# Patient Record
Sex: FEMALE | Race: WHITE
[De-identification: names, ages, dates, MRNs, and addresses within clinical notes are randomized per-mention and may not be internally consistent; named-entity substitution may affect disease eponyms.]

---

## 2021-02-25 ENCOUNTER — HOSPITAL ENCOUNTER (OUTPATIENT)
Dept: HOSPITAL 53 - M PLALAB | Age: 31
End: 2021-02-25
Attending: ADVANCED PRACTICE MIDWIFE
Payer: COMMERCIAL

## 2021-02-25 DIAGNOSIS — Z34.91: Primary | ICD-10-CM

## 2021-02-25 LAB
HCT VFR BLD AUTO: 36.6 % (ref 36–47)
HCV AB SER QL: < 0 INDEX (ref ?–0.8)
HGB BLD-MCNC: 11.9 G/DL (ref 12–15.5)
HIV 1+2 AB+HIV1 P24 AG SERPL QL IA: NEGATIVE
MCH RBC QN AUTO: 31.2 PG (ref 27–33)
MCHC RBC AUTO-ENTMCNC: 32.5 G/DL (ref 32–36.5)
MCV RBC AUTO: 95.8 FL (ref 80–96)
PLATELET # BLD AUTO: 206 10^3/UL (ref 150–450)
RBC # BLD AUTO: 3.82 10^6/UL (ref 4–5.4)
WBC # BLD AUTO: 6.6 10^3/UL (ref 4–10)

## 2021-04-22 ENCOUNTER — HOSPITAL ENCOUNTER (OUTPATIENT)
Dept: HOSPITAL 53 - M WHC | Age: 31
End: 2021-04-22
Attending: ADVANCED PRACTICE MIDWIFE
Payer: COMMERCIAL

## 2021-04-22 DIAGNOSIS — Z3A.12: ICD-10-CM

## 2021-04-22 DIAGNOSIS — Z36.89: Primary | ICD-10-CM

## 2021-04-22 NOTE — REP
INDICATION:

ANATOMY/DEDRICK 9/15/21



COMPARISON:

None.



TECHNIQUE:

Transabdominal obstetrical ultrasound with color Doppler evaluation.



FINDINGS:

Examination demonstrates a single live intrauterine pregnancy in breech presentation.

Fetal motion is identified by technologist.  Placenta is noted posterior and  grade 1

without evidence for placenta previa or abruption.  Amniotic fluid volume is normal.

Cervix measures 3.9 cm in length and appears closed..



Gestational age by LMP 19 weeks 1 day with DEDRICK 09/15/2021.

Gestational age by current measurements 19 weeks 4 days with DEDRICK 09/12/2021.



FHR equals 146 beats per minute.



BPD:      4.3 cm at        19 weeks 0 days

HC:         16.9 cm at         19 weeks 4 days

AC:         14.3 cm at      19 weeks 4 days

FL:          3.2 cm at      19 weeks 6 days

HL:          3.0 cm at      19 weeks 5 days

HC/AC: 1.18

Estimated fetal weight 306 grams (79thpercentile).



Anatomical assessment demonstrates normal structures including cranium, choroid

plexus, cavum, cerebellum/posterior fossa, facial features, lungs, four-chamber

heart/ventricular outflow tracts, diaphragm, stomach, cord insertion/three-vessel

cord, kidneys/bladder, spine, and extremities.



IMPRESSION:

Single live intrauterine pregnancy in breech presentation demonstrating appropriate

estimated fetal weight.  Anatomical assessment is complete and normal.  No gross

abnormalities are identified.





<Electronically signed by Rashaun Thompson > 04/22/21 [id number]

## 2021-06-11 ENCOUNTER — HOSPITAL ENCOUNTER (OUTPATIENT)
Dept: HOSPITAL 53 - M PLALAB | Age: 31
End: 2021-06-11
Attending: ADVANCED PRACTICE MIDWIFE
Payer: COMMERCIAL

## 2021-06-11 DIAGNOSIS — O99.332: Primary | ICD-10-CM

## 2021-06-11 LAB
HCT VFR BLD AUTO: 37.5 % (ref 36–47)
HGB BLD-MCNC: 12 G/DL (ref 12–15.5)
MCH RBC QN AUTO: 32.1 PG (ref 27–33)
MCHC RBC AUTO-ENTMCNC: 32 G/DL (ref 32–36.5)
MCV RBC AUTO: 100.3 FL (ref 80–96)
PLATELET # BLD AUTO: 224 10^3/UL (ref 150–450)
RBC # BLD AUTO: 3.74 10^6/UL (ref 4–5.4)
WBC # BLD AUTO: 8.6 10^3/UL (ref 4–10)

## 2021-08-13 ENCOUNTER — HOSPITAL ENCOUNTER (OUTPATIENT)
Dept: HOSPITAL 53 - M LDO | Age: 31
Discharge: HOME | End: 2021-08-13
Attending: ADVANCED PRACTICE MIDWIFE
Payer: COMMERCIAL

## 2021-08-13 VITALS — BODY MASS INDEX: 20.61 KG/M2 | HEIGHT: 65 IN | WEIGHT: 123.68 LBS

## 2021-08-13 VITALS — SYSTOLIC BLOOD PRESSURE: 112 MMHG | DIASTOLIC BLOOD PRESSURE: 70 MMHG

## 2021-08-13 DIAGNOSIS — Z3A.35: ICD-10-CM

## 2021-08-13 DIAGNOSIS — R60.0: ICD-10-CM

## 2021-08-13 DIAGNOSIS — O60.03: Primary | ICD-10-CM

## 2021-08-13 LAB
APPEARANCE UR: (no result)
BACTERIA UR QL AUTO: (no result)
BILIRUB UR QL STRIP.AUTO: NEGATIVE
GLUCOSE UR QL STRIP.AUTO: NEGATIVE MG/DL
HGB UR QL STRIP.AUTO: NEGATIVE
KETONES UR QL STRIP.AUTO: (no result) MG/DL
LEUKOCYTE ESTERASE UR QL STRIP.AUTO: (no result)
MUCOUS THREADS URNS QL MICRO: (no result)
NITRITE UR QL STRIP.AUTO: NEGATIVE
PH UR STRIP.AUTO: 6 UNITS (ref 5–9)
PROT UR QL STRIP.AUTO: NEGATIVE MG/DL
RBC # UR AUTO: 2 /HPF (ref 0–3)
SP GR UR STRIP.AUTO: 1.01 (ref 1–1.03)
SQUAMOUS #/AREA URNS AUTO: 10 /HPF (ref 0–6)
UROBILINOGEN UR QL STRIP.AUTO: 0.2 MG/DL (ref 0–2)
WBC #/AREA URNS AUTO: 5 /HPF (ref 0–3)

## 2021-08-13 PROCEDURE — 87086 URINE CULTURE/COLONY COUNT: CPT

## 2021-08-13 PROCEDURE — 59025 FETAL NON-STRESS TEST: CPT

## 2021-08-13 PROCEDURE — 81001 URINALYSIS AUTO W/SCOPE: CPT

## 2021-08-13 PROCEDURE — 87081 CULTURE SCREEN ONLY: CPT

## 2021-08-13 NOTE — IPNPDOC
Text Note


Date of Service


The patient was seen on 21.





NOTE


Outpatient





30yo  DEDRICK 9/15/2021. Presents @ 35 w2d. Presents with complaints of 

contractions and "sausage fingers"





Appears mildly uncomfortable. UC @ 2-4 minutes, mild


Cat I tracing


Cervix soft, LTC, far posterior


UA 1+ ketones, 1+ leuk esterase, 5 WBC, 1+ bacteria


Mild lower extremity edema noted, nonpitting





Observed x 2 hours with oral hydration


UC have diminished, irregular and mild


"Fingers feel better."





Discharged home. Will treat urine pending culture.


Enc increased hydration


Keep appt Tuesday





VS,Fishbone, I+O


VS, Fishbone, I+O





Vital Signs








  Date Time  Temp Pulse Resp B/P (MAP) Pulse Ox O2 Delivery O2 Flow Rate FiO2


 


21 20:34 98.0 74 16 112/70 (84)    

















Brigida Salinas CNM  Aug 13, 2021 22:22

## 2021-08-27 ENCOUNTER — HOSPITAL ENCOUNTER (OUTPATIENT)
Dept: HOSPITAL 53 - M LDO | Age: 31
LOS: 1 days | Discharge: HOME | End: 2021-08-28
Attending: ADVANCED PRACTICE MIDWIFE
Payer: COMMERCIAL

## 2021-08-27 VITALS — DIASTOLIC BLOOD PRESSURE: 66 MMHG | SYSTOLIC BLOOD PRESSURE: 110 MMHG

## 2021-08-27 VITALS — WEIGHT: 126.99 LBS | BODY MASS INDEX: 21.16 KG/M2 | HEIGHT: 65 IN

## 2021-08-27 DIAGNOSIS — O60.03: Primary | ICD-10-CM

## 2021-08-27 DIAGNOSIS — Z3A.37: ICD-10-CM

## 2021-08-27 PROCEDURE — 87086 URINE CULTURE/COLONY COUNT: CPT

## 2021-08-27 PROCEDURE — 59025 FETAL NON-STRESS TEST: CPT

## 2021-08-27 PROCEDURE — 96360 HYDRATION IV INFUSION INIT: CPT

## 2021-08-27 PROCEDURE — 81001 URINALYSIS AUTO W/SCOPE: CPT

## 2021-08-27 PROCEDURE — 96361 HYDRATE IV INFUSION ADD-ON: CPT

## 2021-08-28 ENCOUNTER — HOSPITAL ENCOUNTER (OUTPATIENT)
Dept: HOSPITAL 53 - M LDO | Age: 31
Discharge: HOME | End: 2021-08-28
Attending: OBSTETRICS & GYNECOLOGY
Payer: COMMERCIAL

## 2021-08-28 VITALS — SYSTOLIC BLOOD PRESSURE: 83 MMHG | DIASTOLIC BLOOD PRESSURE: 52 MMHG

## 2021-08-28 VITALS — DIASTOLIC BLOOD PRESSURE: 52 MMHG | SYSTOLIC BLOOD PRESSURE: 85 MMHG

## 2021-08-28 VITALS — WEIGHT: 130.29 LBS | BODY MASS INDEX: 21.71 KG/M2 | HEIGHT: 65 IN

## 2021-08-28 VITALS — DIASTOLIC BLOOD PRESSURE: 56 MMHG | SYSTOLIC BLOOD PRESSURE: 91 MMHG

## 2021-08-28 DIAGNOSIS — O36.8130: Primary | ICD-10-CM

## 2021-08-28 DIAGNOSIS — Z3A.37: ICD-10-CM

## 2021-08-28 DIAGNOSIS — Z88.2: ICD-10-CM

## 2021-08-28 DIAGNOSIS — O47.1: ICD-10-CM

## 2021-08-28 LAB
APPEARANCE UR: CLEAR
BACTERIA UR QL AUTO: NEGATIVE
BILIRUB UR QL STRIP.AUTO: NEGATIVE
GLUCOSE UR QL STRIP.AUTO: NEGATIVE MG/DL
HGB UR QL STRIP.AUTO: NEGATIVE
KETONES UR QL STRIP.AUTO: (no result) MG/DL
LEUKOCYTE ESTERASE UR QL STRIP.AUTO: NEGATIVE
NITRITE UR QL STRIP.AUTO: NEGATIVE
PH UR STRIP.AUTO: 6 UNITS (ref 5–9)
PROT UR QL STRIP.AUTO: NEGATIVE MG/DL
RBC # UR AUTO: 0 /HPF (ref 0–3)
SP GR UR STRIP.AUTO: 1 (ref 1–1.03)
SQUAMOUS #/AREA URNS AUTO: 0 /HPF (ref 0–6)
UROBILINOGEN UR QL STRIP.AUTO: 0.2 MG/DL (ref 0–2)
WBC #/AREA URNS AUTO: 0 /HPF (ref 0–3)

## 2021-08-28 PROCEDURE — 59025 FETAL NON-STRESS TEST: CPT

## 2021-08-28 NOTE — IPNPDOC
Text Note


Date of Service


The patient was seen on 21.





NOTE


Labor and Delivery Triage Note:





S: 31 years old  at 37w2d presents with c/o contractions and decreased 

fetal. Denies vaginal bleeding or LOF. 





O: vss, AF rare ctx


Cat 1 fetal tracing


Gen: well appearing, NAD


Abd: gravid, soft, nttp


cx:: 1-/2/50/-1





A/P: 31-year-old  at 37w2d not in Labor


reassuring fetal status


-home with Labor precautions and FKCs.


-f/u at next OB appt





Melanie Robbins MD





VS,Sadi, I+O


VS, Sadi, I+O





Vital Signs








  Date Time  Temp Pulse Resp B/P (MAP) Pulse Ox O2 Delivery O2 Flow Rate FiO2


 


21 20:56 98.7 77 16 91/56 (68)    

















MELANIE ROBBINS MD.                 Aug 28, 2021 21:34

## 2021-09-03 ENCOUNTER — HOSPITAL ENCOUNTER (OUTPATIENT)
Dept: HOSPITAL 53 - M LDO | Age: 31
Discharge: HOME | End: 2021-09-03
Attending: ADVANCED PRACTICE MIDWIFE
Payer: COMMERCIAL

## 2021-09-03 VITALS — SYSTOLIC BLOOD PRESSURE: 102 MMHG | DIASTOLIC BLOOD PRESSURE: 59 MMHG

## 2021-09-03 VITALS — DIASTOLIC BLOOD PRESSURE: 59 MMHG | SYSTOLIC BLOOD PRESSURE: 108 MMHG

## 2021-09-03 VITALS — BODY MASS INDEX: 20.83 KG/M2 | HEIGHT: 65 IN | WEIGHT: 125 LBS

## 2021-09-03 DIAGNOSIS — Z3A.38: ICD-10-CM

## 2021-09-03 DIAGNOSIS — Z88.2: ICD-10-CM

## 2021-09-03 DIAGNOSIS — O60.03: Primary | ICD-10-CM

## 2021-09-03 PROCEDURE — 59025 FETAL NON-STRESS TEST: CPT

## 2021-09-03 NOTE — IPNPDOC
Text Note


Date of Service


The patient was seen on 9/3/21.





NOTE


Outpatient





32yo  DEDRICK 9/15/2021. Presents @ 38w2d with complaints all day, stronger 

this afternoon.





Denies LOF, bleeding. Reports good fetal movement. 


Cat I tracing, UC mild, Q 2-4 minutes


SVE 2+/50/-2, essentially the same as last office exam


Remained unchanged over the course of 2 hours.


Contractions have diminished, Cat I fetal tracing.





Discharged home. Routine precautions. Keep next appt.





VS,Fishbone, I+O


VS, Fishbone, I+O





Vital Signs








  Date Time  Temp Pulse Resp B/P (MAP) Pulse Ox O2 Delivery O2 Flow Rate FiO2


 


9/3/21 15:40 97.3 93 20 108/59 (75)    

















Brigida Salinas CNM   Sep 3, 2021 16:37

## 2021-09-11 ENCOUNTER — HOSPITAL ENCOUNTER (INPATIENT)
Dept: HOSPITAL 53 - M LDI | Age: 31
LOS: 2 days | Discharge: HOME | End: 2021-09-13
Attending: OBSTETRICS & GYNECOLOGY | Admitting: OBSTETRICS & GYNECOLOGY
Payer: COMMERCIAL

## 2021-09-11 VITALS — DIASTOLIC BLOOD PRESSURE: 60 MMHG | SYSTOLIC BLOOD PRESSURE: 97 MMHG

## 2021-09-11 VITALS — SYSTOLIC BLOOD PRESSURE: 101 MMHG | DIASTOLIC BLOOD PRESSURE: 56 MMHG

## 2021-09-11 VITALS — DIASTOLIC BLOOD PRESSURE: 55 MMHG | SYSTOLIC BLOOD PRESSURE: 100 MMHG

## 2021-09-11 VITALS — SYSTOLIC BLOOD PRESSURE: 100 MMHG | DIASTOLIC BLOOD PRESSURE: 66 MMHG

## 2021-09-11 VITALS — SYSTOLIC BLOOD PRESSURE: 102 MMHG | DIASTOLIC BLOOD PRESSURE: 64 MMHG

## 2021-09-11 VITALS — SYSTOLIC BLOOD PRESSURE: 106 MMHG | DIASTOLIC BLOOD PRESSURE: 70 MMHG

## 2021-09-11 VITALS — SYSTOLIC BLOOD PRESSURE: 100 MMHG | DIASTOLIC BLOOD PRESSURE: 64 MMHG

## 2021-09-11 VITALS — SYSTOLIC BLOOD PRESSURE: 95 MMHG | DIASTOLIC BLOOD PRESSURE: 50 MMHG

## 2021-09-11 VITALS — DIASTOLIC BLOOD PRESSURE: 58 MMHG | SYSTOLIC BLOOD PRESSURE: 101 MMHG

## 2021-09-11 VITALS — SYSTOLIC BLOOD PRESSURE: 99 MMHG | DIASTOLIC BLOOD PRESSURE: 58 MMHG

## 2021-09-11 VITALS — SYSTOLIC BLOOD PRESSURE: 102 MMHG | DIASTOLIC BLOOD PRESSURE: 65 MMHG

## 2021-09-11 VITALS — WEIGHT: 125.88 LBS | HEIGHT: 65 IN | BODY MASS INDEX: 20.97 KG/M2

## 2021-09-11 VITALS — SYSTOLIC BLOOD PRESSURE: 101 MMHG | DIASTOLIC BLOOD PRESSURE: 66 MMHG

## 2021-09-11 VITALS — DIASTOLIC BLOOD PRESSURE: 67 MMHG | SYSTOLIC BLOOD PRESSURE: 102 MMHG

## 2021-09-11 VITALS — SYSTOLIC BLOOD PRESSURE: 117 MMHG | DIASTOLIC BLOOD PRESSURE: 69 MMHG

## 2021-09-11 VITALS — SYSTOLIC BLOOD PRESSURE: 104 MMHG | DIASTOLIC BLOOD PRESSURE: 66 MMHG

## 2021-09-11 VITALS — DIASTOLIC BLOOD PRESSURE: 54 MMHG | SYSTOLIC BLOOD PRESSURE: 92 MMHG

## 2021-09-11 VITALS — DIASTOLIC BLOOD PRESSURE: 66 MMHG | SYSTOLIC BLOOD PRESSURE: 99 MMHG

## 2021-09-11 DIAGNOSIS — Z3A.39: ICD-10-CM

## 2021-09-11 LAB
BASE EXCESS BLDCOA CALC-SCNC: -8.4 MMOL/L
BASE EXCESS BLDCOV CALC-SCNC: -5.2 MMOL/L
CO2 BLDCOA CALC-SCNC: 21.5 MEQ/L
CO2 BLDCOV CALC-SCNC: 22.6 MEQ/L
HCO3 STD BLDCOA-SCNC: 16.5 MEQ/L
HCO3 STD BLDCOA-SCNC: 20 MEQ/L
HCO3 STD BLDCOV-SCNC: 19.4 MEQ/L
HCO3 STD BLDCOV-SCNC: 21.3 MEQ/L
HCT VFR BLD AUTO: 37.3 % (ref 36–47)
HGB BLD-MCNC: 12.4 G/DL (ref 12–15.5)
MCH RBC QN AUTO: 31.2 PG (ref 27–33)
MCHC RBC AUTO-ENTMCNC: 33.2 G/DL (ref 32–36.5)
MCV RBC AUTO: 93.7 FL (ref 80–96)
PCO2 BLDCOA: 51.5 MMHG
PCO2 BLDCOV: 44.5 MMHG
PH BLDCOA: 7.21 UNITS
PH BLDCOV: 7.3 UNITS
PLATELET # BLD AUTO: 212 10^3/UL (ref 150–450)
PO2 BLDCOA: 18.3 MMHG
PO2 BLDCOV: 25.7 MMHG
RBC # BLD AUTO: 3.98 10^6/UL (ref 4–5.4)
SAO2 % BLDCOA: 38.5 %
SAO2 % BLDCOV: 61.8 %
WBC # BLD AUTO: 10.4 10^3/UL (ref 4–10)

## 2021-09-11 PROCEDURE — 3E033VJ INTRODUCTION OF OTHER HORMONE INTO PERIPHERAL VEIN, PERCUTANEOUS APPROACH: ICD-10-PCS | Performed by: OBSTETRICS & GYNECOLOGY

## 2021-09-11 RX ADMIN — IBUPROFEN PRN MG: 800 TABLET, FILM COATED ORAL at 21:28

## 2021-09-11 RX ADMIN — SODIUM CHLORIDE, POTASSIUM CHLORIDE, SODIUM LACTATE AND CALCIUM CHLORIDE SCH MLS/HR: 600; 310; 30; 20 INJECTION, SOLUTION INTRAVENOUS at 17:31

## 2021-09-11 RX ADMIN — SODIUM CHLORIDE, POTASSIUM CHLORIDE, SODIUM LACTATE AND CALCIUM CHLORIDE SCH MLS/HR: 600; 310; 30; 20 INJECTION, SOLUTION INTRAVENOUS at 10:46

## 2021-09-11 NOTE — HPEPDOC
Obstetrical History & Physical


General


Date of Admission


Sep 11, 2021 at 08:28





History of Present Illness


induction of labor


Chief Complaint:  Induction of labor


Information Provided By:  Patient


Age:  31


:  3


Livin





Prenatal Care


Prenatal Care:  Good Care





Prenatal Dating


Final EDC:  Sep 15, 2021


Final EDC by:  LMP


EGA at Admission:  39





Past Medical History


Past Obstetrical History #1:  


   Date of Delivery:  Dec 12, 2014


   Type of Delivery:  Spontaneous Vaginal Del.


   Sex of Infant:  Female


   Complications:  No


Past Obstetrical History #2:  


   Date of Delivery:  Fernando 3, 2016


   Type of Delivery:  Spontaneous Vaginal Del.


   Sex of Infant:  Female


   Complications:  No


GYN History:  Human papillomavirus(HPV)


Family History


Significant Family History:  Hypertension





Social History


Marital Status:  


Family situation:  Spouse/partner home


Psychosocial History:  No pertinent psych hx


* Smoker:  current smoker


Alcohol:  Denies


Drugs:  denies





Allergies


Coded Allergies:  


     Sulfa (Sulfonamide Antibiotics) (Verified  Allergy, Unknown, 21)





Medications


Scheduled


Prenatal No.137/Iron/Folic Acd (Prenatal Vitamin Tablet) 1 Each Tablet, 1 TAB PO

DAILY





Physical Examination


Physical Examination


GENERAL: Alert and oriented times three.


BREAST: .


ABDOMEN: Gravid and non-tender to touch.


FETUS: Is vertex (VTX) by sterile vaginal examination (SVE), fetus is vertex 

(VTX) by Leopold.


HEART RATE: Regular rate and rhythm.


LUNGS: Clear to auscultation (CTA).





Vital Signs/I&O





Vital Signs








  Date Time  Temp Pulse Resp B/P (MAP) Pulse Ox O2 Delivery O2 Flow Rate FiO2


 


21 17:05  61 18 100/66 (77)    


 


21 14:05 97.8       











Laboratory Data


24H LABS


Laboratory Tests 2


21 08:57: Serology Scanned Report Hepatitis B Testing


21 10:44: Nucleated Red Blood Cells % (auto) 0.0


CBC/BMP


Laboratory Tests


21 10:44











Pertinent Laboratoy Data


Blood Type:  A+


RBC Antibody Screen:  Negative


HIV:  Negative


Hepatitis B:  Negative


Rapid Plasma Reagin:  Nonreactive


Rubella:  Immune


Chlamydia/Gonorrhea:  Negative


Group B Streptococcus:  Negative


Glucose Tolerance Test:  78





Anatomy Ultrasound


Placenta Location:  Posterior


Normal Anatomy:  Yes


Placenta Previa:  No





Vaginal Examination


Dilation:  3 cm


Effacement:  70%


Station:  -2


Cervical Consistency:  Medium


Cervical Position:  Posterior


Fetal Presentation:  Cephalic presentation





Fetal Assessment


Variability:  Moderate


Accelerations:  Positive





Tocometer


Frequency:  irregular





Assessment/Plan


Assessment


32yo  at 39w3d for social induction of labor


reassuring fetal status





Plan


Admit and orient.


 and consent.


Group B Streptococcus (GBS) negative.


Labs and intravenous (IV) per unit protocol.


Counseled on Pitocin and induction of labor (IOL).


Anticipate normal spontaneous delivery ().


C-S as appropriate.











ROBERT SMALLWOOD MD.                 Sep 11, 2021 17:43

## 2021-09-11 NOTE — DNPDOC
[facility] Delivery Note


Delivery Note


DATE OF DELIVERY: 2021





TIME OF BIRTH: 





GENDER: Male.  





APGARS: 8 and 9





WEIGHT: 3420 g or 7 lbs. 9 oz.





LACERATIONS: none





ANESTHESIA:  none  





ESTIMATED BLOOD LOSS: 200 ml





COUNTS: 5 laparotomy sponges accounted for prior to after delivery.   2 sharps 

removed from delivery field. 


   


DELIVERY NOTE: On 2021 at  Mrs. Marquez 31-year-old  3 now para 

3 had a spontaneous vaginal delivery of a liveborn male infant Apgars 8 and 9. 

Head was delivered occiput anterior (OA), followed by delivery of the shoulders 

and corpus.  Infant was handed to mom with a good cry.  Cord was clamped times 

two and was cut by support person under my direction.  Placenta was then drained

and delivered grossly intact.  A premixed bag of 500 mL of normal saline with 30

units of Pitocin was then bolused along with uterine massage until the uterus 

was firm.  On inspection, cervix, vagina, perineum was grossly intact and 

hemostatic.  Mom and baby in recovery on stable condition.











ROBERT SMALLWOOD MD.                 Sep 11, 2021 20:23

## 2021-09-12 VITALS — SYSTOLIC BLOOD PRESSURE: 105 MMHG | DIASTOLIC BLOOD PRESSURE: 59 MMHG

## 2021-09-12 VITALS — SYSTOLIC BLOOD PRESSURE: 107 MMHG | DIASTOLIC BLOOD PRESSURE: 62 MMHG

## 2021-09-12 RX ADMIN — Medication SCH TAB: at 08:28

## 2021-09-12 RX ADMIN — IBUPROFEN PRN MG: 800 TABLET, FILM COATED ORAL at 08:29

## 2021-09-12 NOTE — IPNPDOC
Postpartum Progress Note


Date of Service:  Sep 12, 2021


Postpartum Day#:  1


Postpartum Progress Note


SUBJECT: Doing well without complaints. Ambulating, voiding and pain is well-

controlled. Reports minimal lochia. 





OBJECTIVE: 


VITAL SIGNS: Within normal limits, afebrile.


Alert and oriented times three.


Abdomen: Fundus firm at U-2. Soft, NTTP.


Ext: neg calf tenderness.


ASSESSMENT: Postpartum day #1 status post . Recovering in stable condition.





PLAN:


1. Continue routine postpartum care


2. Discharge plans for tomorrow





VS, I&O, 24H, Fishbone


Vital Signs/I&O





Vital Signs








  Date Time  Temp Pulse Resp B/P (MAP) Pulse Ox O2 Delivery O2 Flow Rate FiO2


 


21 06:09 96.2 68 16 107/62 (77) 99 Room Air  














I&O- Last 24 Hours up to 6 AM 


 


 21





 05:59


 


Intake Total 2332 ml


 


Output Total 1300 ml


 


Balance 1032 ml











Laboratory Data


24H LABS


Laboratory Tests 2


21 10:44: Nucleated Red Blood Cells % (auto) 0.0


21 20:08: 


Cord Arterial Blood pH 7.206, Cord Arterial Blood PCO2 51.5, Cord Arterial Blood

PO2 18.3, Cord Arterial Blood HCO3 20.0, Cord Arterial Blood Total CO2 21.5, 

Cord Arterial Blood Base Excess -8.4, Cord Arterial Base Excess (Standard 16.5, 

Cord Arterial Bld Oxygen Saturation 38.5, Cord Venous Blood pH 7.297, Cord 

Venous Blood PCO2 44.5, Cord Venous Blood PO2 25.7, Cord Venous Blood HCO3 21.3,

Cord Venous Blood Total CO2 22.6, Cord Venous Base Excess (Actual) -5.2, Cord 

Venous Base Excess (Standard) 19.4, Cord Venous Blood Oxygen Saturation 61.8


CBC/BMP


Laboratory Tests


21 10:44

















ROBERT SMALLWOOD MD.                 Sep 12, 2021 09:06

## 2021-09-13 VITALS — DIASTOLIC BLOOD PRESSURE: 59 MMHG | SYSTOLIC BLOOD PRESSURE: 105 MMHG

## 2021-09-13 RX ADMIN — Medication SCH TAB: at 08:55

## 2023-02-17 ENCOUNTER — HOSPITAL ENCOUNTER (OUTPATIENT)
Dept: HOSPITAL 53 - M PLALAB | Age: 33
End: 2023-02-17
Attending: ADVANCED PRACTICE MIDWIFE
Payer: COMMERCIAL

## 2023-02-17 DIAGNOSIS — Z34.91: Primary | ICD-10-CM

## 2023-02-17 LAB
HCT VFR BLD AUTO: 39.5 % (ref 36–47)
HCV AB SER QL: 0 INDEX (ref ?–0.8)
HGB BLD-MCNC: 12.8 G/DL (ref 12–15.5)
HIV 1+2 AB+HIV1 P24 AG SERPL QL IA: NEGATIVE
MCH RBC QN AUTO: 29.8 PG (ref 27–33)
MCHC RBC AUTO-ENTMCNC: 32.4 G/DL (ref 32–36.5)
MCV RBC AUTO: 92.1 FL (ref 80–96)
N GONORRHOEA RRNA SPEC QL NAA+PROBE: NEGATIVE
PLATELET # BLD AUTO: 232 10^3/UL (ref 150–450)
RBC # BLD AUTO: 4.29 10^6/UL (ref 4–5.4)
WBC # BLD AUTO: 6.4 10^3/UL (ref 4–10)

## 2023-03-16 ENCOUNTER — HOSPITAL ENCOUNTER (OUTPATIENT)
Dept: HOSPITAL 53 - M PLALAB | Age: 33
End: 2023-03-16
Attending: ADVANCED PRACTICE MIDWIFE
Payer: COMMERCIAL

## 2023-03-16 DIAGNOSIS — Z34.91: Primary | ICD-10-CM

## 2023-03-16 PROCEDURE — 87086 URINE CULTURE/COLONY COUNT: CPT

## 2023-05-03 ENCOUNTER — HOSPITAL ENCOUNTER (OUTPATIENT)
Dept: HOSPITAL 53 - M RAD | Age: 33
End: 2023-05-03
Attending: ADVANCED PRACTICE MIDWIFE
Payer: COMMERCIAL

## 2023-05-03 DIAGNOSIS — Z34.81: Primary | ICD-10-CM

## 2023-07-10 ENCOUNTER — HOSPITAL ENCOUNTER (OUTPATIENT)
Dept: HOSPITAL 53 - M PLALAB | Age: 33
End: 2023-07-10
Attending: OBSTETRICS & GYNECOLOGY
Payer: COMMERCIAL

## 2023-07-10 DIAGNOSIS — Z36.9: Primary | ICD-10-CM

## 2023-07-10 LAB
HCT VFR BLD AUTO: 36.4 % (ref 36–47)
HGB BLD-MCNC: 11.7 G/DL (ref 12–15.5)
MCH RBC QN AUTO: 30.9 PG (ref 27–33)
MCHC RBC AUTO-ENTMCNC: 32.1 G/DL (ref 32–36.5)
MCV RBC AUTO: 96 FL (ref 80–96)
N GONORRHOEA RRNA SPEC QL NAA+PROBE: NEGATIVE
PLATELET # BLD AUTO: 174 10^3/UL (ref 150–450)
RBC # BLD AUTO: 3.79 10^6/UL (ref 4–5.4)
WBC # BLD AUTO: 7 10^3/UL (ref 4–10)

## 2023-08-04 ENCOUNTER — HOSPITAL ENCOUNTER (OUTPATIENT)
Dept: HOSPITAL 53 - M LDO | Age: 33
Discharge: HOME | End: 2023-08-04
Attending: ADVANCED PRACTICE MIDWIFE
Payer: COMMERCIAL

## 2023-08-04 VITALS — WEIGHT: 119.27 LBS | BODY MASS INDEX: 19.87 KG/M2 | HEIGHT: 65 IN

## 2023-08-04 VITALS — DIASTOLIC BLOOD PRESSURE: 65 MMHG | SYSTOLIC BLOOD PRESSURE: 106 MMHG

## 2023-08-04 VITALS — SYSTOLIC BLOOD PRESSURE: 93 MMHG | DIASTOLIC BLOOD PRESSURE: 51 MMHG

## 2023-08-04 VITALS — DIASTOLIC BLOOD PRESSURE: 57 MMHG | SYSTOLIC BLOOD PRESSURE: 99 MMHG

## 2023-08-04 VITALS — DIASTOLIC BLOOD PRESSURE: 45 MMHG | SYSTOLIC BLOOD PRESSURE: 86 MMHG

## 2023-08-04 VITALS — DIASTOLIC BLOOD PRESSURE: 55 MMHG | SYSTOLIC BLOOD PRESSURE: 101 MMHG

## 2023-08-04 DIAGNOSIS — Z3A.33: ICD-10-CM

## 2023-08-04 DIAGNOSIS — U07.1: ICD-10-CM

## 2023-08-04 DIAGNOSIS — O98.513: Primary | ICD-10-CM

## 2023-08-04 LAB
ALBUMIN SERPL BCG-MCNC: 2.8 G/DL (ref 3.2–5.2)
ALP SERPL-CCNC: 122 U/L (ref 46–116)
ALT SERPL W P-5'-P-CCNC: < 9 U/L (ref 7–40)
AST SERPL-CCNC: 14 U/L (ref ?–34)
BILIRUB SERPL-MCNC: 0.6 MG/DL (ref 0.3–1.2)
BUN SERPL-MCNC: < 5 MG/DL (ref 9–23)
CALCIUM SERPL-MCNC: 8.4 MG/DL (ref 8.5–10.1)
CHLORIDE SERPL-SCNC: 108 MMOL/L (ref 98–107)
CO2 SERPL-SCNC: 23 MMOL/L (ref 20–31)
CREAT SERPL-MCNC: 0.42 MG/DL (ref 0.55–1.3)
GFR SERPL CREATININE-BSD FRML MDRD: > 60 ML/MIN/{1.73_M2} (ref 60–?)
GLUCOSE SERPL-MCNC: 96 MG/DL (ref 60–100)
HCT VFR BLD AUTO: 31.6 % (ref 36–47)
HGB BLD-MCNC: 10.2 G/DL (ref 12–15.5)
MCH RBC QN AUTO: 29.6 PG (ref 27–33)
MCHC RBC AUTO-ENTMCNC: 32.3 G/DL (ref 32–36.5)
MCV RBC AUTO: 91.6 FL (ref 80–96)
PLATELET # BLD AUTO: 162 10^3/UL (ref 150–450)
POTASSIUM SERPL-SCNC: 3.8 MMOL/L (ref 3.5–5.1)
PROT SERPL-MCNC: 6.1 G/DL (ref 5.7–8.2)
RBC # BLD AUTO: 3.45 10^6/UL (ref 4–5.4)
SODIUM SERPL-SCNC: 141 MMOL/L (ref 136–145)
WBC # BLD AUTO: 7.2 10^3/UL (ref 4–10)

## 2023-08-04 PROCEDURE — 59025 FETAL NON-STRESS TEST: CPT

## 2023-08-04 PROCEDURE — 87486 CHLMYD PNEUM DNA AMP PROBE: CPT

## 2023-08-04 PROCEDURE — 85027 COMPLETE CBC AUTOMATED: CPT

## 2023-08-04 PROCEDURE — 36415 COLL VENOUS BLD VENIPUNCTURE: CPT

## 2023-08-04 PROCEDURE — 87581 M.PNEUMON DNA AMP PROBE: CPT

## 2023-08-04 PROCEDURE — 87633 RESP VIRUS 12-25 TARGETS: CPT

## 2023-08-04 PROCEDURE — 80053 COMPREHEN METABOLIC PANEL: CPT

## 2023-08-04 PROCEDURE — 81001 URINALYSIS AUTO W/SCOPE: CPT

## 2023-08-04 PROCEDURE — 87086 URINE CULTURE/COLONY COUNT: CPT

## 2023-08-04 PROCEDURE — 87798 DETECT AGENT NOS DNA AMP: CPT

## 2023-08-21 ENCOUNTER — HOSPITAL ENCOUNTER (OUTPATIENT)
Dept: HOSPITAL 53 - M LDO | Age: 33
Discharge: HOME | End: 2023-08-21
Attending: ADVANCED PRACTICE MIDWIFE
Payer: COMMERCIAL

## 2023-08-21 VITALS — BODY MASS INDEX: 20.31 KG/M2 | HEIGHT: 65 IN | WEIGHT: 121.92 LBS

## 2023-08-21 VITALS — DIASTOLIC BLOOD PRESSURE: 75 MMHG | SYSTOLIC BLOOD PRESSURE: 98 MMHG | OXYGEN SATURATION: 100 %

## 2023-08-21 DIAGNOSIS — Z3A.36: ICD-10-CM

## 2023-08-21 DIAGNOSIS — Z88.2: ICD-10-CM

## 2023-08-21 DIAGNOSIS — O47.03: Primary | ICD-10-CM

## 2023-08-21 PROCEDURE — 76815 OB US LIMITED FETUS(S): CPT

## 2023-08-21 PROCEDURE — 59025 FETAL NON-STRESS TEST: CPT

## 2023-08-21 PROCEDURE — 87081 CULTURE SCREEN ONLY: CPT

## 2023-08-26 ENCOUNTER — HOSPITAL ENCOUNTER (OUTPATIENT)
Dept: HOSPITAL 53 - M LDO | Age: 33
Discharge: HOME | End: 2023-08-26
Attending: OBSTETRICS & GYNECOLOGY
Payer: COMMERCIAL

## 2023-08-26 VITALS — SYSTOLIC BLOOD PRESSURE: 108 MMHG | DIASTOLIC BLOOD PRESSURE: 71 MMHG

## 2023-08-26 VITALS — HEIGHT: 65 IN | WEIGHT: 123.68 LBS | BODY MASS INDEX: 20.61 KG/M2

## 2023-08-26 DIAGNOSIS — Z3A.36: ICD-10-CM

## 2023-08-26 DIAGNOSIS — O47.03: Primary | ICD-10-CM

## 2023-08-26 PROCEDURE — 59025 FETAL NON-STRESS TEST: CPT

## 2023-09-04 ENCOUNTER — HOSPITAL ENCOUNTER (OUTPATIENT)
Dept: HOSPITAL 53 - M LDO | Age: 33
LOS: 1 days | Discharge: HOME | End: 2023-09-05
Attending: ADVANCED PRACTICE MIDWIFE
Payer: COMMERCIAL

## 2023-09-04 VITALS — WEIGHT: 125 LBS | BODY MASS INDEX: 20.83 KG/M2 | HEIGHT: 65 IN

## 2023-09-04 VITALS — DIASTOLIC BLOOD PRESSURE: 75 MMHG | SYSTOLIC BLOOD PRESSURE: 109 MMHG

## 2023-09-04 DIAGNOSIS — Z3A.38: ICD-10-CM

## 2023-09-04 DIAGNOSIS — O47.1: Primary | ICD-10-CM

## 2023-09-04 PROCEDURE — 59025 FETAL NON-STRESS TEST: CPT

## 2023-09-05 VITALS — SYSTOLIC BLOOD PRESSURE: 96 MMHG | DIASTOLIC BLOOD PRESSURE: 55 MMHG

## 2024-02-08 ENCOUNTER — HOSPITAL ENCOUNTER (OUTPATIENT)
Dept: HOSPITAL 53 - M PLALAB | Age: 34
End: 2024-02-08
Attending: ADVANCED PRACTICE MIDWIFE
Payer: COMMERCIAL

## 2024-02-08 DIAGNOSIS — Z84.81: ICD-10-CM

## 2024-02-08 DIAGNOSIS — Z80.3: Primary | ICD-10-CM

## 2024-02-13 ENCOUNTER — HOSPITAL ENCOUNTER (OUTPATIENT)
Dept: HOSPITAL 53 - M WUC | Age: 34
End: 2024-02-13
Attending: NURSE PRACTITIONER
Payer: COMMERCIAL

## 2024-02-13 DIAGNOSIS — R30.0: ICD-10-CM

## 2024-02-13 DIAGNOSIS — R05.9: Primary | ICD-10-CM

## 2024-02-14 ENCOUNTER — HOSPITAL ENCOUNTER (EMERGENCY)
Dept: HOSPITAL 53 - M ED | Age: 34
Discharge: HOME | End: 2024-02-14
Payer: COMMERCIAL

## 2024-02-14 VITALS — TEMPERATURE: 98.1 F | OXYGEN SATURATION: 99 % | DIASTOLIC BLOOD PRESSURE: 58 MMHG | SYSTOLIC BLOOD PRESSURE: 99 MMHG

## 2024-02-14 VITALS — BODY MASS INDEX: 16.57 KG/M2 | HEIGHT: 65 IN | WEIGHT: 99.43 LBS

## 2024-02-14 DIAGNOSIS — E87.1: Primary | ICD-10-CM

## 2024-02-14 DIAGNOSIS — Z87.09: ICD-10-CM

## 2024-02-14 DIAGNOSIS — Z88.2: ICD-10-CM

## 2024-02-14 DIAGNOSIS — Z87.891: ICD-10-CM

## 2024-02-14 DIAGNOSIS — R07.89: ICD-10-CM

## 2024-02-14 DIAGNOSIS — Z87.01: ICD-10-CM

## 2024-02-14 LAB
B-HCG SERPL QL: NEGATIVE
BASOPHILS # BLD AUTO: 0 10^3/UL (ref 0–0.2)
BASOPHILS NFR BLD AUTO: 0.4 % (ref 0–1)
BUN SERPL-MCNC: < 5 MG/DL (ref 9–23)
CALCIUM SERPL-MCNC: 8.7 MG/DL (ref 8.5–10.1)
CHLORIDE SERPL-SCNC: 104 MMOL/L (ref 98–107)
CO2 SERPL-SCNC: 20 MMOL/L (ref 20–31)
CREAT SERPL-MCNC: 0.5 MG/DL (ref 0.55–1.3)
EOSINOPHIL # BLD AUTO: 0 10^3/UL (ref 0–0.5)
EOSINOPHIL NFR BLD AUTO: 0 % (ref 0–3)
GFR SERPL CREATININE-BSD FRML MDRD: > 60 ML/MIN/{1.73_M2} (ref 60–?)
GLUCOSE SERPL-MCNC: 91 MG/DL (ref 60–100)
HCT VFR BLD AUTO: 38.4 % (ref 36–47)
HGB BLD-MCNC: 12.6 G/DL (ref 12–15.5)
LYMPHOCYTES # BLD AUTO: 0.5 10^3/UL (ref 1.5–5)
LYMPHOCYTES NFR BLD AUTO: 6.3 % (ref 24–44)
MCH RBC QN AUTO: 29 PG (ref 27–33)
MCHC RBC AUTO-ENTMCNC: 32.8 G/DL (ref 32–36.5)
MCV RBC AUTO: 88.5 FL (ref 80–96)
MONOCYTES # BLD AUTO: 0.7 10^3/UL (ref 0–0.8)
MONOCYTES NFR BLD AUTO: 8.9 % (ref 2–8)
NEUTROPHILS # BLD AUTO: 6.5 10^3/UL (ref 1.5–8.5)
NEUTROPHILS NFR BLD AUTO: 84 % (ref 36–66)
PLATELET # BLD AUTO: 170 10^3/UL (ref 150–450)
POTASSIUM SERPL-SCNC: 4.7 MMOL/L (ref 3.5–5.1)
RBC # BLD AUTO: 4.34 10^6/UL (ref 4–5.4)
SODIUM SERPL-SCNC: 131 MMOL/L (ref 136–145)
WBC # BLD AUTO: 7.7 10^3/UL (ref 4–10)

## 2024-02-14 PROCEDURE — 96361 HYDRATE IV INFUSION ADD-ON: CPT

## 2024-02-14 PROCEDURE — 85025 COMPLETE CBC W/AUTO DIFF WBC: CPT

## 2024-02-14 PROCEDURE — 85379 FIBRIN DEGRADATION QUANT: CPT

## 2024-02-14 PROCEDURE — 99284 EMERGENCY DEPT VISIT MOD MDM: CPT

## 2024-02-14 PROCEDURE — 87798 DETECT AGENT NOS DNA AMP: CPT

## 2024-02-14 PROCEDURE — 87581 M.PNEUMON DNA AMP PROBE: CPT

## 2024-02-14 PROCEDURE — 87633 RESP VIRUS 12-25 TARGETS: CPT

## 2024-02-14 PROCEDURE — 80048 BASIC METABOLIC PNL TOTAL CA: CPT

## 2024-02-14 PROCEDURE — 96374 THER/PROPH/DIAG INJ IV PUSH: CPT

## 2024-02-14 PROCEDURE — 87486 CHLMYD PNEUM DNA AMP PROBE: CPT

## 2024-02-14 PROCEDURE — 84703 CHORIONIC GONADOTROPIN ASSAY: CPT

## 2024-02-14 RX ADMIN — KETOROLAC TROMETHAMINE ONE MG: 30 INJECTION, SOLUTION INTRAMUSCULAR at 15:00

## 2024-02-14 RX ADMIN — SODIUM CHLORIDE ONE MLS/HR: 9 INJECTION, SOLUTION INTRAVENOUS at 15:00

## 2024-10-20 ENCOUNTER — HOSPITAL ENCOUNTER (OUTPATIENT)
Dept: HOSPITAL 53 - M LAB REF | Age: 34
End: 2024-10-20
Attending: REGISTERED NURSE
Payer: COMMERCIAL

## 2024-10-20 DIAGNOSIS — N39.0: ICD-10-CM

## 2024-10-20 DIAGNOSIS — N76.0: Primary | ICD-10-CM

## 2024-10-20 DIAGNOSIS — A60.04: ICD-10-CM

## 2024-10-20 LAB
N GONORRHOEA RRNA SPEC QL NAA+PROBE: NEGATIVE
T VAGINALIS RRNA SPEC QL NAA+PROBE: NOT DETECTED